# Patient Record
Sex: MALE | Race: WHITE | Employment: FULL TIME | URBAN - METROPOLITAN AREA
[De-identification: names, ages, dates, MRNs, and addresses within clinical notes are randomized per-mention and may not be internally consistent; named-entity substitution may affect disease eponyms.]

---

## 2018-07-04 ENCOUNTER — HOSPITAL ENCOUNTER (EMERGENCY)
Facility: CLINIC | Age: 28
Discharge: HOME | End: 2018-07-04
Attending: EMERGENCY MEDICINE | Admitting: EMERGENCY MEDICINE

## 2018-07-04 VITALS
DIASTOLIC BLOOD PRESSURE: 68 MMHG | OXYGEN SATURATION: 99 % | HEIGHT: 74 IN | TEMPERATURE: 98.9 F | SYSTOLIC BLOOD PRESSURE: 124 MMHG

## 2018-07-04 DIAGNOSIS — S60.222A CONTUSION OF LEFT HAND, INITIAL ENCOUNTER: ICD-10-CM

## 2018-07-04 DIAGNOSIS — S00.83XA CONTUSION OF FACE, SCALP AND NECK, INITIAL ENCOUNTER: ICD-10-CM

## 2018-07-04 DIAGNOSIS — S09.90XA CLOSED HEAD INJURY, INITIAL ENCOUNTER: ICD-10-CM

## 2018-07-04 DIAGNOSIS — S10.93XA CONTUSION OF FACE, SCALP AND NECK, INITIAL ENCOUNTER: ICD-10-CM

## 2018-07-04 DIAGNOSIS — S01.81XA FACIAL LACERATION, INITIAL ENCOUNTER: ICD-10-CM

## 2018-07-04 DIAGNOSIS — S00.03XA CONTUSION OF FACE, SCALP AND NECK, INITIAL ENCOUNTER: ICD-10-CM
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
## 2018-07-04 LAB — ALCOHOL BREATH TEST: 0.12 (ref 0–0.01)

## 2018-07-04 PROCEDURE — 12011 RPR F/E/E/N/L/M 2.5 CM/<: CPT

## 2018-07-04 PROCEDURE — 99283 EMERGENCY DEPT VISIT LOW MDM: CPT

## 2018-07-04 ASSESSMENT — ENCOUNTER SYMPTOMS: WOUND: 1

## 2018-07-04 NOTE — ED AVS SNAPSHOT
Emergency Department    [address] [facility] [address]    Phone:  [phone]    Fax:  [phone]                                       Delores rGant   MRN: [id number]    Department:   Emergency Department   Date of Visit:  7/4/2018           After Visit Summary Signature Page     I have received my discharge instructions, and my questions have been answered. I have discussed any challenges I see with this plan with the nurse or doctor.    ..........................................................................................................................................  Patient/Patient Representative Signature      ..........................................................................................................................................  Patient Representative Print Name and Relationship to Patient    ..................................................               ................................................  Date                                            Time    ..........................................................................................................................................  Reviewed by Signature/Title    ...................................................              ..............................................  Date                                                            Time

## 2018-07-04 NOTE — ED NOTES
Adderall discovered in belongings.  States he is on no meds and is holding for a friend.  9 pills in a baggie given to security to dispose
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
## 2018-07-04 NOTE — DISCHARGE INSTRUCTIONS
After a Concussion     Awaken to check alertness as often as the health care provider suggests.     If you had a mild concussion (a head injury), watch closely for signs of problems during the first 48 hours after the injury. Follow the doctor s advice about recovering at home. Use the tips on this handout as a guide.  Note: You should not be left alone after a concussion. If no adult can stay with the injured person, let the doctor know.  Have someone call 911 or your emergency number if you can't fully wake up or have a seizures or convulsions.   The first 48 hours  Don t take medicine unless approved by your healthcare provider. Try placing a cold, damp cloth on your head to help relieve a headache.    Ask the doctor before using any medicines.    Don't drink alcohol or take sedatives or medicines that make you sleepy.    Don't return to sports or any activity that could cause you to hit your head until all symptoms are gone and you have been cleared by your doctor. A second head injury before fully recovering from the first one can lead to serious brain injury.    Don't do activities that need a lot of concentration or a lot of attention. This will allow your brain to rest and heal more quickly.    Return to regular physical and mental activity as directed and approved by your healthcare provider.  Tips about sleeping  For the first day or two, it may be best not to sleep for long periods of time without being checked for alertness. Follow the doctor s instructions.  ? Have someone wake you every ____ hours for the next ____ hours. He or she should ask you questions to check for alertness.  ? OK to sleep through the night.     When to call the healthcare provider  If you notice any of the following, call the healthcare provider:    Vomiting. Some vomiting is common, but tell the provider about any vomiting.    Clear or bloody drainage from the nose or ear    Constant drowsiness or difficulty in waking  up    Confusion or memory loss    Blurred vision or any vision changes    Inability to walk or talk normally    Increased weakness or problems with coordination    Constant, unrelieved headache that becomes more severe    Changes in behavior or personality    High-pitched crying in infants    Signs of stroke such as paralysis of parts of the body    Uncrontrolled movements suggesting a seizure   Date Last Reviewed: 12/1/2017 2000-2017 [employer]. [address] [place] [address]. All rights reserved. This information is not intended as a substitute for professional medical care. Always follow your healthcare professional's instructions.          Facial Contusion  A contusion is another word for a bruise. It happens when small blood vessels break open and leak blood into the nearby area. A facial contusion can result from a bump, hit, or fall. This may happen during sports or an accident. Symptoms of a contusion often include changes in skin color (bruising), swelling, and pain.   The swelling from the contusion should decrease in a few days. Bruising and pain may take several weeks to go away.   Home care    If you have been prescribed medicines for pain, take them as directed.    To help reduce swelling and pain, wrap a cold pack or bag of frozen peas in a thin towel. Put it on the injured area for up to 20 minutes. Do this a few times a day until the swelling goes down.     If you have scrapes or cuts on your face requiring stiches or other closures, care for them as directed.    For the next 24 hours (or longer if instructed):  ? Don t drink alcohol, or use sedatives or medicines that make you sleepy.  ? Don t drive or operate machinery.  ? Don't do anything strenuous. Don t lift or strain.  ? Don't return to sports or other activity that could result in another head injury.  Note about concussions  Because the injury was to your head, it is possible that a concussion (mild brain  "injury) could result. Symptoms of a concussion can show up later. Be alert for signs and symptoms of a concussion. Seek emergency medical care if any of these develop over the next hours to days:    Headache    Nausea or vomiting    Dizziness    Sensitivity to light or noise    Unusual sleepiness or grogginess    Trouble falling asleep    Personality changes    Vision changes    Memory loss    Confusion    Trouble walking or clumsiness    Loss of consciousness (even for a short time)    Inability to be awakened    Feeling \"off\" or slow as if in a daze   Follow-up care  Follow up with your healthcare provider, or as directed.  When to seek medical advice  Call your healthcare provider right away if any of these occur:    Swelling or pain that gets worse, not better    New swelling or pain    Warmth or drainage from the swollen area or from cuts or scrapes    Fluid drainage or bleeding from the nose or ears    Fever of 100.4 F (38 C) or higher, or as directed by your healthcare provider  Call 911  Call 911 if any of the following occur:     Repeated vomiting    Unusual drowsiness or trouble awakening    Fainting or loss of consciousness    Seizure    Worsening confusion, memory loss, dizziness, headache, behavior, speech, or vision  Date Last Reviewed: 5/1/2017 2000-2017 [employer]. [address] [place] [address]. All rights reserved. This information is not intended as a substitute for professional medical care. Always follow your healthcare professional's instructions.          Black Eye     Wrap a thin towel around a cold pack before applying it to your eye.     A black eye is really a bruise around your eye. It is often caused by an injury to your face or head. It is not usually due to an injury to the eye itself. The swelling and black-and-blue color happen because of blood and fluids collecting in the skin around your eye. A black eye should return to normal in 1 or 2 weeks.  When to " go to the emergency room (ER)  In many cases, a black eye is a minor injury. It can be treated at home with cold packs and pain medicine. But seek medical care right away if you have any of these symptoms:    A change or loss of vision    Trouble moving your eye up and down or side to side     Blood inside your eye, or bleeding from your nose or ears    Fluid leaking from your eye  What to expect in the ER  While in the ER, you may expect the following:     Your injury will be examined.    Your vision, the way your eye moves, and the bones around your eye will be checked.    You may have a fluorescein stain test. This uses dye and a special light to check for damage to the surface of your eye.    An X-ray or other tests may be done.    Depending on the results of your exam and tests, you may be referred to an eye specialist (ophthalmologist).  Follow-up  While your eye is healing, call your healthcare provider if you notice any of these symptoms:    Swelling that doesn't improve after a few days    Increased or severe pain    Changes in your vision    Warmth, redness, or pus near the bruise  To reduce pain and swelling from a black eye    Apply ice packs every 20 minutes while you're awake for the first 24 hours.    Use warm compresses every 20 minutes while you're awake for the next 24 hours.   Date Last Reviewed: 10/1/2017    2491-5738 [employer]. [address] [place] [address]. All rights reserved. This information is not intended as a substitute for professional medical care. Always follow your healthcare professional's instructions.          Facial Contusion with Sleep Monitoring  A contusion is another word for a bruise. It happens when small blood vessels break open and leak blood into the nearby area. A facial contusion can result from a bump, hit, or fall. This may happen during sports or an accident. Symptoms of a contusion often include changes in skin color (bruising), swelling,  and pain.   Because the injury was to your face, it could have caused a concussion (mild brain injury). Symptoms of concussion can show up later. For this reason, you need to watch for symptoms of concussion once you're home. You need someone to wake you up during the night to check for the symptoms of a concussion listed below.  The swelling from the contusion should decrease in a few days. Bruising and pain may take several weeks to go away.   Home care  Sleep monitoring  Someone must stay with you for the next 24 hours (or longer, if directed). This person should wake you up every 2 hours to check for signs of concussion. These include:    Nausea    Vomiting    Dizziness or balance problems    Confusion    Headache    Memory loss    Loss on consciousness    Drowsiness (This may be normal when awakened from a deep sleep in the middle of the night)    Irritability    Trouble speaking or communicating    Changes in sleep patterns    Depression  If any of these symptoms develop at any time, get medical care right away. If no concussion symptoms are noted during the first 24 hours, continue watching for symptoms for the next day or so. Ask your provider if someone should stay with you during this time.   General care    If you have been prescribed medicines for pain, take them as directed.    To help reduce swelling and pain from the contusion, wrap a cold pack or bag of frozen peas in a thin towel. Put it on the injured area for up to 20 minutes. Do this a few times a day until the swelling goes down.     If you have scrapes or cuts on your face requiring stiches or other closures, care for them as directed.    For the next 24 hours (or longer if instructed):  ? Don t drink alcohol, or use sedatives or medicines that make you sleepy.  ? Don t drive or operate machinery.  ? Don't do anything strenuous. Don t lift or strain.  ? Don't return to sports or other activity that could result in another head  injury.  Follow-up care  Follow up with your healthcare provider or our staff as directed.   When to seek medical advice  Call your healthcare provider right away if any of these occur:    Swelling or pain that gets worse, not better    New swelling or pain    Warmth or drainage from the swollen area or from cuts or scrapes    Fluid drainage or bleeding from the nose or ears    Fever of 100.4 F (38 C) or higher, or as directed by your healthcare provider  Call 911  Call 911 if any of the following occur:     Repeated vomiting    Unusual drowsiness or unusual trouble awakening    Fainting or loss of consciousness    Seizure (convulsion)    Worsening confusion, memory loss, dizziness, headache, behavior, speech, or vision  Date Last Reviewed: 5/1/2017 2000-2017 [employer]. [address] [place] [address], [place] [address]. All rights reserved. This information is not intended as a substitute for professional medical care. Always follow your healthcare professional's instructions.          Head Injury with Sleep Monitoring (Adult)    You have a head injury. It does not appear serious at this time. But symptoms of a more serious problem, such as mild brain injury (concussion), or bruising or bleeding in the brain, may appear later. For this reason, you and someone caring for you will need to watch for the symptoms listed below. Once at home, also be sure to follow any care instructions you re given.  Home care  Watch for the following symptoms  Someone must stay with you for the next 24 hours (or longer, if directed). If you fall asleep, this person should wake you up every 2 hours to check your symptoms. This is called sleep monitoring. Symptoms to watch for include:    Headache    Nausea or vomiting    Dizziness    Sensitivity to light or noise    Unusual sleepiness or grogginess    Trouble falling asleep    Personality changes    Vision changes    Memory loss    Confusion    Trouble walking or  clumsiness    Loss of consciousness (even for a short time)    Inability to be awakened    Stiff neck    Weakness or numbness in any part of the body    Seizures  If you develop any of these symptoms, seek emergency medical medical care right away. If none of these symptoms are noted during the first 24 hours, keep watching for symptoms for the next day or so. Ask your provider if someone should stay with you during this time.   General care    If you were prescribed medicines for pain, use them as directed. Note: Don t use other pain medicines without checking with your provider first.    To help reduce swelling and pain, apply a cold source to the injured area for up to 20 minutes at a time. Do this as often as directed. Use a cold pack or bag of ice wrapped in a thin towel. Never apply a cold source directly to the skin.    If you have cuts or scrapes as a result of your injury, care for them as directed.    For the next 24 hours (or longer, if instructed):  ? Don t drink alcohol or use sedatives or other medicines that make you sleepy.  ? Don t drive or operate machinery.  ? Don t do anything strenuous, such as heavy lifting or straining.  ? Limit tasks that require concentration. This includes reading, using a smartphone or computer, watching TV, and playing video games.  ? Don t return to sports or other activity that could result in another head injury.  Follow-up care  Follow up with your healthcare provider, or as directed. If imaging tests were done, they will be reviewed by a doctor. You will be told the results and any new findings that may affect your care.  When to seek medical advice  Call your healthcare provider right away if any of these occur:    Pain doesn t get better or worsens    New or increased swelling or bruising    Fever of 100.4 F (38 C) or higher, or as directed by your provider    Redness, warmth, bleeding, or drainage from the injured area    Any depression or bony abnormality in the  injured area    Fluid drainage or bleeding from the nose or ears  Date Last Reviewed: 9/26/2015 2000-2017 [email or web address]. [address] [place] [address], [place] [address]. All rights reserved. This information is not intended as a substitute for professional medical care. Always follow your healthcare professional's instructions.          Hand Contusion  You have a contusion. This is also called a bruise. There is swelling and some bleeding under the skin, but no broken bones. This injury generally takes a few days to a few weeks to heal.  During that time, the bruise will typically change in color from reddish, to purple-blue, to greenish-yellow, then to yellow-brown.  Home care    Elevate the hand to reduce pain and swelling. As much as possible, sit or lie down with the hand raised about the level of your heart. This is especially important during the first 48 hours.    Ice the hand to help reduce pain and swelling. Wrap a cold source (ice pack or ice cubes in a plastic bag) in a thin towel. Apply to the bruised area for 20 minutes every 1 to 2 hours the first day. Continue this 3 to 4 times a day until the pain and swelling goes away.    Unless another medicine was prescribed, you can take acetaminophen, ibuprofen, or naproxen to control pain. (If you have chronic liver or kidney disease or ever had a stomach ulcer or gastrointestinal bleeding, talk with your doctor before using these medicines.)  Follow up  Follow up with your healthcare provider or our staff as advised. Call if you are not improving within 1 to 2 weeks.  When to seek medical advice   Call your healthcare provider right away if you have any of the following:    Increased pain or swelling    Arm becomes cold, blue, numb or tingly    Signs of infection: Warmth, drainage, or increased redness or pain around the bruise    Inability to move the injured hand     Frequent bruising for unknown reasons  Date Last Reviewed: 2/1/2017 2000-2017  [employer]. [address] [place] [address]. All rights reserved. This information is not intended as a substitute for professional medical care. Always follow your healthcare professional's instructions.           * LACERATION (All Closures)  A laceration is a cut through the skin. This will usually require stitches (sutures) or staples if it is deep. Minor cuts may be treated with a tape closure ( Steri-Strips ) or Dermabond skin glue.       HOME CARE:  PAIN MEDICINE: You may use acetaminophen (Tylenol) 650-1000 mg every 6 hours or ibuprofen (Motrin, Advil) 600 mg every 6-8 hours with food to control pain, if you are able to take these medicines. [NOTE: If you have chronic liver or kidney disease or ever had a stomach ulcer or GI bleeding, talk with your doctor before using these medicines.]  EXTREMITY, FACE or TRUNK WOUNDS:    Keep the wound clean and dry. If a bandage was applied and it becomes wet or dirty, replace it. Otherwise, leave it in place for the first 24 hours.    If stitches or staples were used, clean the wound daily. Protect the wound from sunlight and tanning lamps.    After removing the bandage, wash the area with soap and water. Use a wet cotton swab (Q tip) to loosen and remove any blood or crust that forms.    After cleaning, apply a thin layer of Polysporin or Bacitracin ointment. This will keep the wound clean and make it easier to remove the stitches or staples. Reapply a fresh bandage.    You may remove the bandage to shower as usual after the first 24 hours, but do not soak the area in water (no swimming) until the stitches or staples are removed.    If Steri-Strips were used, keep the area clean and dry. If it becomes wet, blot it dry with a towel. It is okay to take a brief shower, but avoid scrubbing the area.    If Dermabond skin adhesive was used, do not scratch, rub or pick at the adhesive film. Do not place tape directly over the film. Do not apply liquid,  ointment or creams to the wound while the film is in place. Do not clean the wound with peroxide and do not apply ointments. Avoid activities that cause heavy sweating until the film has fallen off. Protect the wound from prolonged exposure to sunlight or tanning lamps. You may shower as usual but do not soak the wound in water (no baths or swimming). The film will fall off by itself in 5-10 days.  SCALP WOUNDS: During the first two days, you may carefully rinse your hair in the shower to remove blood, glass or dirt particles. After two days, you may shower and shampoo your hair normally. Do not soak your scalp in the tub or go swimming until the stitches or staples have been removed.  MOUTH WOUNDS: Eat soft foods to reduce pain. If the cut is inside of your mouth, clean by rinsing after each meal and at bedtime with a mixture of equal parts water and Hydrogen Peroxide (do not swallow!). Or, you can use a cotton swab to directly apply Hydrogen Peroxide onto the cut.  After the wound is done healing, use sunscreen over the area whenever exposed for the next 6 minths to avoid a darker scar.     FOLLOW UP: Most skin wounds heal within ten days. Mouth and facial wounds heal within five days. However, even with proper treatment, a wound infection may sometimes occur. Therefore, you should check the wound daily for signs of infection listed below.  Stitches should be removed from the face within five days; stitches and staples should be removed from other parts of the body within 7-10 days. Unless you are told to come back to the emergency room, you may have your doctor or urgent care remove the stitches. If dissolving stitches were used in the mouth, these will fall out or dissolve without the need for removal. If tape closures ( Steri-Strips ) were used, remove them yourself if they have not fallen off after 7 days. If Dermabond skin glue was used, the film will fall off by itself in 5-10 days.      GET PROMPT MEDICAL  ATTENTION  if any of the following occur:    Increasing pain in the wound    Redness, swelling or pus coming from the wound    Fever over 101 F (38.3 C) oral    If stitches or staples come apart or fall out or if Steri-Strips fall off before seven days    If the wound edges re-open    Bleeding not controlled by direct pressure    1691-1178 [employer]. [address] [place] [address]. All rights reserved. This information is not intended as a substitute for professional medical care. Always follow your healthcare professional's instructions.  This information has been modified by your health care provider with permission from the publisher.

## 2019-12-28 NOTE — ED AVS SNAPSHOT
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 that you may receive in the mail after you visit with us. Thank you!        [facility] Information     [facility] lets you send messages to your doctor, view your test results, renew your prescriptions, schedule appointments and more. To sign up, go to [email or web address] . Click on \"Log in\" on the left side of the screen, which will take you to the Welcome page. Then click on \"Sign up Now\" on the right side of the page.     You will be asked to enter the access code listed below, as well as some personal information. Please follow the directions to create your username and password.     Your access code is: [email or web address]  Expires: 10/2/2018  6:11 AM     Your access code will  in 90 days. If you need help or a new code, please call your [facility] or [phone].        Care EveryWhere ID     This is your Care EveryWhere ID. This could be used by other organizations to access your [facility] medical records  [id number]        Equal Access to Services     HALEY ROBINS AH: jasmeet Mandujano, migue lo " blayne goldsmith ah. So [facility] [phone].    ATENCIÓN: Si habla español, tiene a mullen disposición servicios gratuitos de asistencia lingüística. Llame al [phone].    We comply with applicable federal civil rights laws and [place] laws. We do not discriminate on the basis of race, color, national origin, age, disability, sex, sexual orientation, or gender identity.            After Visit Summary       This is your record. Keep this with you and show to your community pharmacist(s) and doctor(s) at your next visit.